# Patient Record
Sex: FEMALE | Race: WHITE
[De-identification: names, ages, dates, MRNs, and addresses within clinical notes are randomized per-mention and may not be internally consistent; named-entity substitution may affect disease eponyms.]

---

## 2017-11-24 ENCOUNTER — HOSPITAL ENCOUNTER (OUTPATIENT)
Dept: HOSPITAL 53 - M LAB | Age: 47
End: 2017-11-24
Attending: FAMILY MEDICINE
Payer: SELF-PAY

## 2017-11-24 DIAGNOSIS — I10: Primary | ICD-10-CM

## 2017-11-24 LAB
ALBUMIN SERPL BCG-MCNC: 3.9 GM/DL (ref 3.2–5.2)
ALBUMIN/GLOB SERPL: 1.05 {RATIO} (ref 1–1.93)
ALP SERPL-CCNC: 48 U/L (ref 45–117)
ALT SERPL W P-5'-P-CCNC: 54 U/L (ref 12–78)
ANION GAP SERPL CALC-SCNC: 7 MEQ/L (ref 8–16)
AST SERPL-CCNC: 20 U/L (ref 7–37)
BASOPHILS # BLD AUTO: 0 10^3/UL (ref 0–0.2)
BASOPHILS NFR BLD AUTO: 0.3 % (ref 0–1)
BILIRUB SERPL-MCNC: 0.4 MG/DL (ref 0.2–1)
BUN SERPL-MCNC: 10 MG/DL (ref 7–18)
CALCIUM SERPL-MCNC: 9.1 MG/DL (ref 8.5–10.1)
CHLORIDE SERPL-SCNC: 105 MEQ/L (ref 98–107)
CHOLEST SERPL-MCNC: 208 MG/DL (ref ?–200)
CO2 SERPL-SCNC: 30 MEQ/L (ref 21–32)
CREAT SERPL-MCNC: 0.55 MG/DL (ref 0.55–1.02)
EOSINOPHIL # BLD AUTO: 0.1 10^3/UL (ref 0–0.5)
EOSINOPHIL NFR BLD AUTO: 2 % (ref 0–3)
ERYTHROCYTE [DISTWIDTH] IN BLOOD BY AUTOMATED COUNT: 12.2 % (ref 11.5–14.5)
GFR SERPL CREATININE-BSD FRML MDRD: > 60 ML/MIN/{1.73_M2} (ref 58–?)
GLUCOSE SERPL-MCNC: 90 MG/DL (ref 70–105)
IMM GRANULOCYTES NFR BLD: 0.5 % (ref 0–0)
LYMPHOCYTES # BLD AUTO: 2 10^3/UL (ref 1.5–4.5)
LYMPHOCYTES NFR BLD AUTO: 29.3 % (ref 24–44)
MCH RBC QN AUTO: 28.3 PG (ref 27–33)
MCHC RBC AUTO-ENTMCNC: 33.9 G/DL (ref 32–36.5)
MCV RBC AUTO: 83.4 FL (ref 80–96)
MONOCYTES # BLD AUTO: 0.6 10^3/UL (ref 0–0.8)
MONOCYTES NFR BLD AUTO: 8.4 % (ref 0–5)
NEUTROPHILS # BLD AUTO: 4 10^3/UL (ref 1.8–7.7)
NEUTROPHILS NFR BLD AUTO: 59.5 % (ref 36–66)
NRBC BLD AUTO-RTO: 0 % (ref 0–0)
PLATELET # BLD AUTO: 251 10^3/UL (ref 150–450)
POTASSIUM SERPL-SCNC: 3.2 MEQ/L (ref 3.5–5.1)
PROT SERPL-MCNC: 7.6 GM/DL (ref 6.4–8.2)
SODIUM SERPL-SCNC: 142 MEQ/L (ref 136–145)
TRIGL SERPL-MCNC: 87 MG/DL (ref ?–150)
WBC # BLD AUTO: 6.7 10^3/UL (ref 4–10)

## 2019-06-14 ENCOUNTER — HOSPITAL ENCOUNTER (OUTPATIENT)
Dept: HOSPITAL 53 - M SMT | Age: 49
End: 2019-06-14
Attending: FAMILY MEDICINE
Payer: SELF-PAY

## 2019-06-14 DIAGNOSIS — I10: Primary | ICD-10-CM

## 2019-06-14 DIAGNOSIS — E78.00: ICD-10-CM

## 2019-06-14 LAB
BUN SERPL-MCNC: 11 MG/DL (ref 7–18)
CALCIUM SERPL-MCNC: 9.4 MG/DL (ref 8.5–10.1)
CHLORIDE SERPL-SCNC: 103 MEQ/L (ref 98–107)
CHOLEST SERPL-MCNC: 186 MG/DL (ref ?–200)
CHOLEST/HDLC SERPL: 5.81 {RATIO} (ref ?–5)
CO2 SERPL-SCNC: 33 MEQ/L (ref 21–32)
CREAT SERPL-MCNC: 0.64 MG/DL (ref 0.55–1.3)
GFR SERPL CREATININE-BSD FRML MDRD: > 60 ML/MIN/{1.73_M2} (ref 58–?)
GLUCOSE SERPL-MCNC: 97 MG/DL (ref 70–100)
HDLC SERPL-MCNC: 32 MG/DL (ref 40–?)
LDLC SERPL CALC-MCNC: 101 MG/DL (ref ?–100)
NONHDLC SERPL-MCNC: 154 MG/DL
POTASSIUM SERPL-SCNC: 3.5 MEQ/L (ref 3.5–5.1)
SODIUM SERPL-SCNC: 140 MEQ/L (ref 136–145)
TRIGL SERPL-MCNC: 263 MG/DL (ref ?–150)

## 2019-06-23 ENCOUNTER — HOSPITAL ENCOUNTER (EMERGENCY)
Dept: HOSPITAL 53 - M ED | Age: 49
Discharge: HOME | End: 2019-06-23
Payer: SELF-PAY

## 2019-06-23 VITALS — HEIGHT: 63 IN | BODY MASS INDEX: 32.32 KG/M2 | WEIGHT: 182.39 LBS

## 2019-06-23 VITALS — SYSTOLIC BLOOD PRESSURE: 146 MMHG | DIASTOLIC BLOOD PRESSURE: 79 MMHG

## 2019-06-23 DIAGNOSIS — M75.22: Primary | ICD-10-CM

## 2019-06-23 NOTE — REP
Left shoulder three views:

Mineralization and joint spaces are normal.

There is no fracture or dislocation.

There are faintly visible calcifications adjacent to the humeral head compatible

with calcific tendonitis.

Impression:

Calcifications compatible with calcific tendonitis.

 

 

Electronically Signed by

Khang Russell MD 06/23/2019 08:49 A

## 2019-06-25 ENCOUNTER — HOSPITAL ENCOUNTER (OUTPATIENT)
Dept: HOSPITAL 53 - M LAB | Age: 49
End: 2019-06-25
Attending: PHYSICIAN ASSISTANT
Payer: SELF-PAY

## 2019-06-25 DIAGNOSIS — M75.32: Primary | ICD-10-CM

## 2019-06-25 LAB
BASOPHILS # BLD AUTO: 0 10^3/UL (ref 0–0.2)
BASOPHILS NFR BLD AUTO: 0.6 % (ref 0–1)
CRP SERPL-MCNC: < 0.3 MG/DL (ref 0–0.3)
EOSINOPHIL # BLD AUTO: 0.1 10^3/UL (ref 0–0.5)
EOSINOPHIL NFR BLD AUTO: 1.8 % (ref 0–3)
ERYTHROCYTE [SEDIMENTATION RATE] IN BLOOD BY WESTERGREN METHOD: 16 MM/HR (ref 0–20)
HCT VFR BLD AUTO: 42.4 % (ref 36–47)
HGB BLD-MCNC: 14.2 G/DL (ref 12–15.5)
LYMPHOCYTES # BLD AUTO: 1.7 10^3/UL (ref 1.5–4.5)
LYMPHOCYTES NFR BLD AUTO: 23.4 % (ref 24–44)
MCH RBC QN AUTO: 27.7 PG (ref 27–33)
MCHC RBC AUTO-ENTMCNC: 33.5 G/DL (ref 32–36.5)
MCV RBC AUTO: 82.8 FL (ref 80–96)
MONOCYTES # BLD AUTO: 0.6 10^3/UL (ref 0–0.8)
MONOCYTES NFR BLD AUTO: 7.8 % (ref 0–5)
NEUTROPHILS # BLD AUTO: 4.7 10^3/UL (ref 1.8–7.7)
NEUTROPHILS NFR BLD AUTO: 66 % (ref 36–66)
PLATELET # BLD AUTO: 287 10^3/UL (ref 150–450)
RBC # BLD AUTO: 5.12 10^6/UL (ref 4–5.4)
RHEUMATOID FACT SERPL-ACNC: < 10 IU/ML (ref ?–15)
URATE SERPL-MCNC: 2.3 MG/DL (ref 2.6–6)
WBC # BLD AUTO: 7.1 10^3/UL (ref 4–10)

## 2019-06-27 LAB
B BURGDOR IGG+IGM SER-ACNC: <0.91 ISR (ref 0–0.9)
B BURGDOR IGM SER IA-ACNC: <0.8 INDEX (ref 0–0.79)
DSDNA AB SER-ACNC: 4 IU/ML (ref 0–9)
ENA RNP AB SER-ACNC: 3.7 AI (ref 0–0.9)
ENA SM AB SER-ACNC: <0.2 AI (ref 0–0.9)
ENA SS-A AB SER-ACNC: <0.2 AI (ref 0–0.9)
ENA SS-B AB SER-ACNC: <0.2 AI (ref 0–0.9)

## 2019-10-09 ENCOUNTER — HOSPITAL ENCOUNTER (OUTPATIENT)
Dept: HOSPITAL 53 - M LAB | Age: 49
End: 2019-10-09
Attending: INTERNAL MEDICINE
Payer: SELF-PAY

## 2019-10-09 DIAGNOSIS — R76.8: Primary | ICD-10-CM

## 2019-10-09 LAB
C3 SERPL-MCNC: 116 MG/DL (ref 90–180)
C4 SERPL-MCNC: 18 MG/DL (ref 10–40)
CK SERPL-CCNC: 167 U/L (ref 26–192)
TSH SERPL DL<=0.005 MIU/L-ACNC: 1.27 UIU/ML (ref 0.36–3.74)

## 2019-10-11 LAB
ENA RNP AB SER-ACNC: 2.8 AI (ref 0–0.9)
ENA SM AB SER-ACNC: < 0.2 AI (ref 0–0.9)

## 2021-09-23 ENCOUNTER — HOSPITAL ENCOUNTER (OUTPATIENT)
Dept: HOSPITAL 53 - M WUC | Age: 51
End: 2021-09-23
Attending: NURSE PRACTITIONER
Payer: SELF-PAY

## 2021-09-23 DIAGNOSIS — R06.02: Primary | ICD-10-CM

## 2021-09-23 NOTE — REP
INDICATION:

SOB.



COMPARISON:

No comparison study.



TECHNIQUE:

Two views..



FINDINGS:

The lungs are well inflated and free of infiltrate.  The pleural angles are sharp.

The heart size is normal.  Pulmonary vasculature is not increased.  No significant

bony abnormality is seen.



IMPRESSION:

Negative chest x-ray.





<Electronically signed by Marlon Wan > 09/23/21 [id number]